# Patient Record
Sex: FEMALE | Race: OTHER | HISPANIC OR LATINO
[De-identification: names, ages, dates, MRNs, and addresses within clinical notes are randomized per-mention and may not be internally consistent; named-entity substitution may affect disease eponyms.]

---

## 2017-06-16 ENCOUNTER — OTHER (OUTPATIENT)
Age: 57
End: 2017-06-16

## 2017-06-20 ENCOUNTER — LABORATORY RESULT (OUTPATIENT)
Age: 57
End: 2017-06-20

## 2017-06-20 ENCOUNTER — APPOINTMENT (OUTPATIENT)
Dept: INTERNAL MEDICINE | Facility: CLINIC | Age: 57
End: 2017-06-20

## 2017-06-20 VITALS
DIASTOLIC BLOOD PRESSURE: 80 MMHG | HEART RATE: 70 BPM | HEIGHT: 60 IN | SYSTOLIC BLOOD PRESSURE: 120 MMHG | OXYGEN SATURATION: 98 % | WEIGHT: 134 LBS | BODY MASS INDEX: 26.31 KG/M2

## 2017-06-20 DIAGNOSIS — F98.0 ENURESIS NOT DUE TO A SUBSTANCE OR KNOWN PHYSIOLOGICAL CONDITION: ICD-10-CM

## 2017-06-26 LAB
25(OH)D3 SERPL-MCNC: 25 NG/ML
ANION GAP SERPL CALC-SCNC: 18 MMOL/L
APPEARANCE: CLEAR
BACTERIA UR CULT: NORMAL
BILIRUBIN URINE: NEGATIVE
BLOOD URINE: ABNORMAL
BUN SERPL-MCNC: 15 MG/DL
CALCIUM SERPL-MCNC: 9.8 MG/DL
CHLORIDE SERPL-SCNC: 101 MMOL/L
CHOLEST SERPL-MCNC: 202 MG/DL
CHOLEST/HDLC SERPL: 2.5 RATIO
CO2 SERPL-SCNC: 23 MMOL/L
COLOR: YELLOW
CREAT SERPL-MCNC: 0.78 MG/DL
GLUCOSE QUALITATIVE U: NORMAL MG/DL
GLUCOSE SERPL-MCNC: 117 MG/DL
HBA1C MFR BLD HPLC: 6.3 %
HDLC SERPL-MCNC: 82 MG/DL
KETONES URINE: NEGATIVE
LDLC SERPL CALC-MCNC: 101 MG/DL
LEUKOCYTE ESTERASE URINE: NEGATIVE
NITRITE URINE: NEGATIVE
PH URINE: 7
POTASSIUM SERPL-SCNC: 4.8 MMOL/L
PROTEIN URINE: NEGATIVE MG/DL
SODIUM SERPL-SCNC: 142 MMOL/L
SPECIFIC GRAVITY URINE: 1.02
TRIGL SERPL-MCNC: 94 MG/DL
TSH SERPL-ACNC: 4.56 UIU/ML
UROBILINOGEN URINE: NORMAL MG/DL

## 2017-09-06 ENCOUNTER — APPOINTMENT (OUTPATIENT)
Dept: UROGYNECOLOGY | Facility: CLINIC | Age: 57
End: 2017-09-06
Payer: COMMERCIAL

## 2017-09-06 VITALS
WEIGHT: 132 LBS | BODY MASS INDEX: 25.91 KG/M2 | DIASTOLIC BLOOD PRESSURE: 70 MMHG | SYSTOLIC BLOOD PRESSURE: 122 MMHG | HEIGHT: 60 IN

## 2017-09-06 PROCEDURE — 51701 INSERT BLADDER CATHETER: CPT

## 2017-09-06 PROCEDURE — 99244 OFF/OP CNSLTJ NEW/EST MOD 40: CPT | Mod: 25

## 2017-09-07 ENCOUNTER — RESULT REVIEW (OUTPATIENT)
Age: 57
End: 2017-09-07

## 2017-09-07 LAB
APPEARANCE: CLEAR
BACTERIA: NEGATIVE
BILIRUB UR QL STRIP: NORMAL
BILIRUBIN URINE: NEGATIVE
BLOOD URINE: NEGATIVE
CLARITY UR: CLEAR
COLLECTION METHOD: NORMAL
COLOR: YELLOW
GLUCOSE QUALITATIVE U: NORMAL MG/DL
GLUCOSE UR-MCNC: NORMAL
HCG UR QL: 0.2 EU/DL
HGB UR QL STRIP.AUTO: NORMAL
HYALINE CASTS: 0 /LPF
KETONES UR-MCNC: NORMAL
KETONES URINE: NEGATIVE
LEUKOCYTE ESTERASE UR QL STRIP: NORMAL
LEUKOCYTE ESTERASE URINE: NEGATIVE
MICROSCOPIC-UA: NORMAL
NITRITE UR QL STRIP: NORMAL
NITRITE URINE: NEGATIVE
PH UR STRIP: 5.5
PH URINE: 6.5
PROT UR STRIP-MCNC: NORMAL
PROTEIN URINE: NEGATIVE MG/DL
RED BLOOD CELLS URINE: 2 /HPF
SP GR UR STRIP: 1
SPECIFIC GRAVITY URINE: 1.01
SQUAMOUS EPITHELIAL CELLS: 0 /HPF
URINE COMMENTS: NORMAL
UROBILINOGEN URINE: NORMAL MG/DL
WHITE BLOOD CELLS URINE: 1 /HPF

## 2017-09-08 ENCOUNTER — RESULT REVIEW (OUTPATIENT)
Age: 57
End: 2017-09-08

## 2017-09-08 LAB — BACTERIA UR CULT: NORMAL

## 2017-09-22 ENCOUNTER — APPOINTMENT (OUTPATIENT)
Dept: UROGYNECOLOGY | Facility: CLINIC | Age: 57
End: 2017-09-22
Payer: COMMERCIAL

## 2017-09-22 ENCOUNTER — OUTPATIENT (OUTPATIENT)
Dept: OUTPATIENT SERVICES | Facility: HOSPITAL | Age: 57
LOS: 1 days | End: 2017-09-22
Payer: COMMERCIAL

## 2017-09-22 PROCEDURE — 51729 CYSTOMETROGRAM W/VP&UP: CPT | Mod: 26

## 2017-09-22 PROCEDURE — 51797 INTRAABDOMINAL PRESSURE TEST: CPT | Mod: 26

## 2017-09-22 PROCEDURE — 51784 ANAL/URINARY MUSCLE STUDY: CPT | Mod: 26

## 2017-09-22 PROCEDURE — 51729 CYSTOMETROGRAM W/VP&UP: CPT

## 2017-09-22 PROCEDURE — 51784 ANAL/URINARY MUSCLE STUDY: CPT

## 2017-09-22 PROCEDURE — 51797 INTRAABDOMINAL PRESSURE TEST: CPT

## 2017-09-22 RX ORDER — OXYCODONE AND ACETAMINOPHEN 5; 325 MG/1; MG/1
5-325 TABLET ORAL
Qty: 60 | Refills: 0 | Status: DISCONTINUED | COMMUNITY
Start: 2017-03-28

## 2017-09-27 ENCOUNTER — APPOINTMENT (OUTPATIENT)
Dept: UROGYNECOLOGY | Facility: CLINIC | Age: 57
End: 2017-09-27
Payer: COMMERCIAL

## 2017-09-27 DIAGNOSIS — Z87.448 PERSONAL HISTORY OF OTHER DISEASES OF URINARY SYSTEM: ICD-10-CM

## 2017-09-27 DIAGNOSIS — N39.3 STRESS INCONTINENCE (FEMALE) (MALE): ICD-10-CM

## 2017-09-27 DIAGNOSIS — R35.0 FREQUENCY OF MICTURITION: ICD-10-CM

## 2017-09-27 DIAGNOSIS — R39.15 URGENCY OF URINATION: ICD-10-CM

## 2017-09-27 PROCEDURE — 99214 OFFICE O/P EST MOD 30 MIN: CPT

## 2017-09-28 ENCOUNTER — RESULT REVIEW (OUTPATIENT)
Age: 57
End: 2017-09-28

## 2017-09-28 LAB
BILIRUB UR QL STRIP: NORMAL
CLARITY UR: CLEAR
COLLECTION METHOD: NORMAL
GLUCOSE UR-MCNC: NORMAL
HCG UR QL: 0.2 EU/DL
HGB UR QL STRIP.AUTO: NORMAL
KETONES UR-MCNC: NORMAL
LEUKOCYTE ESTERASE UR QL STRIP: NORMAL
NITRITE UR QL STRIP: NORMAL
PH UR STRIP: 5.5
PROT UR STRIP-MCNC: NORMAL
SP GR UR STRIP: 1.01

## 2017-10-02 ENCOUNTER — MEDICATION RENEWAL (OUTPATIENT)
Age: 57
End: 2017-10-02

## 2017-10-02 DIAGNOSIS — N39.3 STRESS INCONTINENCE (FEMALE) (MALE): ICD-10-CM

## 2017-10-02 LAB — BACTERIA UR CULT: ABNORMAL

## 2017-10-02 RX ORDER — SULFAMETHOXAZOLE AND TRIMETHOPRIM 800; 160 MG/1; MG/1
800-160 TABLET ORAL TWICE DAILY
Qty: 6 | Refills: 0 | Status: ACTIVE | COMMUNITY
Start: 2017-10-02 | End: 1900-01-01

## 2017-11-27 ENCOUNTER — RX RENEWAL (OUTPATIENT)
Age: 57
End: 2017-11-27

## 2017-12-27 ENCOUNTER — APPOINTMENT (OUTPATIENT)
Dept: UROGYNECOLOGY | Facility: CLINIC | Age: 57
End: 2017-12-27

## 2022-04-07 ENCOUNTER — RESULT REVIEW (OUTPATIENT)
Age: 62
End: 2022-04-07

## 2022-04-20 ENCOUNTER — APPOINTMENT (OUTPATIENT)
Dept: PAIN MANAGEMENT | Facility: CLINIC | Age: 62
End: 2022-04-20
Payer: OTHER MISCELLANEOUS

## 2022-04-20 VITALS — BODY MASS INDEX: 26.5 KG/M2 | WEIGHT: 135 LBS | HEIGHT: 60 IN

## 2022-04-20 DIAGNOSIS — M79.10 MYALGIA, UNSPECIFIED SITE: ICD-10-CM

## 2022-04-20 PROCEDURE — 99072 ADDL SUPL MATRL&STAF TM PHE: CPT

## 2022-04-20 PROCEDURE — 99214 OFFICE O/P EST MOD 30 MIN: CPT

## 2022-04-20 NOTE — HISTORY OF PRESENT ILLNESS
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

## 2022-04-20 NOTE — DISCUSSION/SUMMARY
[Surgical risks reviewed] : Surgical risks reviewed [de-identified] : MRI reviewed with patient.\par \par After discussing various treatment options with the patient including but not limited to oral medications, physical therapy, exercise,\par modalities as well as interventional spinal injections, we have decided with the following plan\par \par I personally reviewed the MRI/CT scan images and agree with the radiologist's report. The\par radiological findings were discussed with the patient.\par \par \par The risks, benefits, contents and alternatives to injection were explained in full to the patient. Risks outlined include but are not\par limited to infection,sepsis, bleeding, post-dural puncture headache, nerve damage, temporary increase in pain, syncopal episode,\par failure to resolve symptoms, allergic reaction, symptom recurrence, and elevation of blood sugar in diabetics. Cortisone may cause\par immunosuppression. Patient understands the risks. All questions were answered. After discussion of options, patient requested\par an injection. Information regarding the injection was given to the patient. Which medications to stop prior to the injection was\par explained to the patient as well.\par \par Follow up in 1-2 weeks post injection for re-evaluation.\par \par  Conservative Care\par Continue Home exercises, stretching, activity modification, physical therapy, and conservative care.\par

## 2022-04-20 NOTE — PHYSICAL EXAM
[Normal Coordination] : normal coordination [Normal DTR UE/LE] : normal DTR UE/LE  [Normal Sensation] : normal sensation [Normal Mood and Affect] : normal mood and affect [Orientated] : orientated [Able to Communicate] : able to communicate [Normal Skin] : normal skin [No Rash] : no rash [No Ulcers] : no ulcers [No Lesions] : no lesions [No obvious lymphadenopathy in areas examined] : no obvious lymphadenopathy in areas examined [Well Developed] : well developed [Well Nourished] : well nourished [No Respiratory Distress] : no respiratory distress [] : positive Spurling [Left Deltoid] : left deltoid [Left Radial Forearm] : left radial forearm [Left Ulnar Forearm] : left ulnar forearm

## 2022-04-20 NOTE — WORK
[Other: ___] : [unfilled] [Was the competent medical cause of the injury] : was the competent medical cause of the injury [Are consistent with the injury] : are consistent with the injury [Consistent with my objective findings] : consistent with my objective findings [Partial] : partial [Can return to work with limitations on ______] : can return to work with limitations on [unfilled] [Rx may affect patient's ability to return to work, make patient drowsy, or other issue] : Rx may affect patient's ability to return to work, make patient drowsy, or other issue. [I provided the services listed above] :  I provided the services listed above. [FreeTextEntry1] : guarded [FreeTextEntry3] : JUSTIN Jaime

## 2022-08-03 ENCOUNTER — APPOINTMENT (OUTPATIENT)
Dept: PAIN MANAGEMENT | Facility: CLINIC | Age: 62
End: 2022-08-03

## 2022-09-21 ENCOUNTER — APPOINTMENT (OUTPATIENT)
Dept: PAIN MANAGEMENT | Facility: CLINIC | Age: 62
End: 2022-09-21

## 2022-09-21 VITALS — WEIGHT: 137 LBS | BODY MASS INDEX: 26.9 KG/M2 | HEIGHT: 60 IN

## 2022-09-21 PROCEDURE — 99072 ADDL SUPL MATRL&STAF TM PHE: CPT

## 2022-09-21 PROCEDURE — 99214 OFFICE O/P EST MOD 30 MIN: CPT

## 2022-09-21 NOTE — HISTORY OF PRESENT ILLNESS
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

## 2022-09-21 NOTE — DISCUSSION/SUMMARY
[Surgical risks reviewed] : Surgical risks reviewed [de-identified] : MRI reviewed with patient.\par \par After discussing various treatment options with the patient including but not limited to oral medications, physical therapy, exercise,\par modalities as well as interventional spinal injections, we have decided with the following plan\par \par I personally reviewed the MRI/CT scan images and agree with the radiologist's report. The\par radiological findings were discussed with the patient.\par \par \par The risks, benefits, contents and alternatives to injection were explained in full to the patient. Risks outlined include but are not\par limited to infection,sepsis, bleeding, post-dural puncture headache, nerve damage, temporary increase in pain, syncopal episode,\par failure to resolve symptoms, allergic reaction, symptom recurrence, and elevation of blood sugar in diabetics. Cortisone may cause\par immunosuppression. Patient understands the risks. All questions were answered. After discussion of options, patient requested\par an injection. Information regarding the injection was given to the patient. Which medications to stop prior to the injection was\par explained to the patient as well.\par \par Follow up in 1-2 weeks post injection for re-evaluation.\par \par Proceed with C7-T1 YULISA. \par \par Pt never received a call to schedule injection from last visit. Pain is bilaterally radiating. \par \par Pt has not been doing PT or chiro. \par \par \par  Conservative Care\par Continue Home exercises, stretching, activity modification, physical therapy, and conservative care.\par

## 2022-11-09 ENCOUNTER — APPOINTMENT (OUTPATIENT)
Dept: ORTHOPEDIC SURGERY | Facility: CLINIC | Age: 62
End: 2022-11-09

## 2022-11-09 VITALS — BODY MASS INDEX: 26.9 KG/M2 | WEIGHT: 137 LBS | HEIGHT: 60 IN

## 2022-11-09 PROCEDURE — 73030 X-RAY EXAM OF SHOULDER: CPT | Mod: RT

## 2022-11-09 PROCEDURE — 99072 ADDL SUPL MATRL&STAF TM PHE: CPT

## 2022-11-09 PROCEDURE — 73010 X-RAY EXAM OF SHOULDER BLADE: CPT | Mod: RT

## 2022-11-09 PROCEDURE — 99214 OFFICE O/P EST MOD 30 MIN: CPT

## 2022-11-09 NOTE — IMAGING
[Right] : right shoulder [Degenerative change] : Degenerative change [de-identified] : Right shoulder:\par Skin intact\par FF: 170\par Abd: 160\par ER: 60\par IR: 50\par %/% strength in all planes\par NVI distally

## 2022-11-09 NOTE — WORK
[Sprain/Strain] : sprain/strain [Was not the competent medical cause of the injury] : was not the competent medical cause of the injury [Are consistent with the injury] : are consistent with the injury [Mild Partial] : mild partial [Reveals pre-existing condition(s) that may affect treatment/prognosis] : reveals pre-existing condition(s) that may affect treatment/prognosis [Unknown at this time] : : unknown at this time [Patient] : patient [No Rx restrictions] : No Rx restrictions. [I provided the services listed above] :  I provided the services listed above. [Can return to work without limitations on ______] : can return to work without limitations on [unfilled] [FreeTextEntry2] : prior rtc repair

## 2022-11-09 NOTE — HISTORY OF PRESENT ILLNESS
[6] : 6 [5] : 5 [Dull/Aching] : dull/aching [Meds] : meds [Ice] : ice [] : no [FreeTextEntry1] : R SHOULDER [FreeTextEntry3] : FEW WEEKS AGO [FreeTextEntry5] : NO INJURY [de-identified] : 2020-DR. PARRA

## 2022-11-09 NOTE — ASSESSMENT
[FreeTextEntry1] : Will restart PT and wean to a HEP.\par Diclofenac as needed.\par Follow up as needed.\par Approaching MMI

## 2022-11-11 ENCOUNTER — NON-APPOINTMENT (OUTPATIENT)
Age: 62
End: 2022-11-11

## 2022-11-14 ENCOUNTER — APPOINTMENT (OUTPATIENT)
Age: 62
End: 2022-11-14

## 2022-11-14 PROCEDURE — 62321 NJX INTERLAMINAR CRV/THRC: CPT

## 2022-11-30 ENCOUNTER — APPOINTMENT (OUTPATIENT)
Dept: PAIN MANAGEMENT | Facility: CLINIC | Age: 62
End: 2022-11-30

## 2023-04-12 ENCOUNTER — APPOINTMENT (OUTPATIENT)
Dept: PAIN MANAGEMENT | Facility: CLINIC | Age: 63
End: 2023-04-12

## 2023-04-13 ENCOUNTER — APPOINTMENT (OUTPATIENT)
Dept: PAIN MANAGEMENT | Facility: CLINIC | Age: 63
End: 2023-04-13

## 2023-04-14 ENCOUNTER — APPOINTMENT (OUTPATIENT)
Dept: PAIN MANAGEMENT | Facility: CLINIC | Age: 63
End: 2023-04-14

## 2023-04-17 ENCOUNTER — APPOINTMENT (OUTPATIENT)
Dept: ORTHOPEDIC SURGERY | Facility: CLINIC | Age: 63
End: 2023-04-17
Payer: OTHER MISCELLANEOUS

## 2023-04-17 VITALS — BODY MASS INDEX: 26.5 KG/M2 | WEIGHT: 135 LBS | HEIGHT: 60 IN

## 2023-04-17 PROCEDURE — 99213 OFFICE O/P EST LOW 20 MIN: CPT

## 2023-04-17 RX ORDER — DICLOFENAC SODIUM 75 MG/1
75 TABLET, DELAYED RELEASE ORAL
Qty: 60 | Refills: 0 | Status: ACTIVE | COMMUNITY
Start: 2022-11-09 | End: 1900-01-01

## 2023-04-17 NOTE — IMAGING
[de-identified] : RIGHT SHOULDER\par No swelling, no ecchymosis, no deformity, no scapular winging.\par No tenderness to palpation over shoulder, AC joint or trapezius.\par Forward flexion to 180; external rotation to 90 degrees (with shoulder abducted); internal rotation to 70 degrees (with shoulder abducted) WITH PAIN\par 5/5 supraspinatus, infraspinatus and subscapularis WITH PAIN\par Negative Newton test, POSITIVE impingement sign, negative O’Alfa test.\par Speed’s and yergason negative\par Motor and sensory intact distally\par

## 2023-04-17 NOTE — HISTORY OF PRESENT ILLNESS
[6] : 6 [5] : 5 [Dull/Aching] : dull/aching [Meds] : meds [Ice] : ice [Right Arm] : right arm [Work related] : work related [Gradual] : gradual [Full time] : Work status: full time [de-identified] : 04/17/2023 here today with right shoulder pain follow up  ,still in pain  [] : no [FreeTextEntry1] : R SHOULDER [FreeTextEntry3] : 03/05/2021 [FreeTextEntry5] : work injury  [de-identified] : with activity  [de-identified] : 2020-DR. PARRA [de-identified] : nothing

## 2023-04-17 NOTE — WORK
[Sprain/Strain] : sprain/strain [Was not the competent medical cause of the injury] : was not the competent medical cause of the injury [Are consistent with the injury] : are consistent with the injury [Mild Partial] : mild partial [Reveals pre-existing condition(s) that may affect treatment/prognosis] : reveals pre-existing condition(s) that may affect treatment/prognosis [Unknown at this time] : : unknown at this time [Patient] : patient [No Rx restrictions] : No Rx restrictions. [I provided the services listed above] :  I provided the services listed above. [Can return to work with limitations on ______] : can return to work with limitations on [unfilled] [Lifting] : lifting [FreeTextEntry2] : prior rtc repair

## 2023-04-17 NOTE — ASSESSMENT
[FreeTextEntry1] : PAIN PERSISTS IN RIGHT SHOULDER\par CSI DISCUSSED, PT DECLINED\par DICLOFENAC RX\par RESTART PT\par UPDATED MRI TO EVAL WORSENING OF PRIOR TEAR SEEN ON MRI IN 2020, H/O RCR 2017\par WORKING LIGHT DUTY\par RTO P MRI

## 2023-04-19 ENCOUNTER — APPOINTMENT (OUTPATIENT)
Dept: PAIN MANAGEMENT | Facility: CLINIC | Age: 63
End: 2023-04-19

## 2023-04-19 ENCOUNTER — APPOINTMENT (OUTPATIENT)
Dept: ORTHOPEDIC SURGERY | Facility: CLINIC | Age: 63
End: 2023-04-19

## 2023-04-19 ENCOUNTER — APPOINTMENT (OUTPATIENT)
Dept: PAIN MANAGEMENT | Facility: CLINIC | Age: 63
End: 2023-04-19
Payer: OTHER MISCELLANEOUS

## 2023-04-19 VITALS — HEIGHT: 60 IN | WEIGHT: 136 LBS | BODY MASS INDEX: 26.7 KG/M2

## 2023-04-19 PROCEDURE — 99214 OFFICE O/P EST MOD 30 MIN: CPT

## 2023-04-19 NOTE — ASSESSMENT
[FreeTextEntry1] : 11/22 YULISA C7-T1\par \par Pt failed to followup but notes approx. 50% relief lasting for approx. 1 month or more and then pain returned. Given her response to injections, prior to proceeding with repeat wull obtain surgical input

## 2023-04-19 NOTE — HISTORY OF PRESENT ILLNESS
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

## 2023-04-19 NOTE — PHYSICAL EXAM
[Normal Coordination] : normal coordination [Normal DTR UE/LE] : normal DTR UE/LE  [Normal Sensation] : normal sensation [Orientated] : orientated [Normal Mood and Affect] : normal mood and affect [Able to Communicate] : able to communicate [Normal Skin] : normal skin [No Rash] : no rash [No Ulcers] : no ulcers [No Lesions] : no lesions [No obvious lymphadenopathy in areas examined] : no obvious lymphadenopathy in areas examined [Well Developed] : well developed [Well Nourished] : well nourished [No Respiratory Distress] : no respiratory distress [] : positive Spurling [Left Deltoid] : left deltoid [Left Radial Forearm] : left radial forearm [Left Ulnar Forearm] : left ulnar forearm

## 2023-04-19 NOTE — DISCUSSION/SUMMARY
[Surgical risks reviewed] : Surgical risks reviewed [de-identified] : After discussing various treatment options with the patient including but not limited to oral medications, physical therapy, exercise,\par modalities as well as interventional spinal injections, we have decided with the following plan\par \par I personally reviewed the MRI/CT scan images and agree with the radiologist's report. The\par radiological findings were discussed with the patient.\par \par \par The risks, benefits, contents and alternatives to injection were explained in full to the patient. Risks outlined include but are not\par limited to infection,sepsis, bleeding, post-dural puncture headache, nerve damage, temporary increase in pain, syncopal episode,\par failure to resolve symptoms, allergic reaction, symptom recurrence, and elevation of blood sugar in diabetics. Cortisone may cause\par immunosuppression. Patient understands the risks. All questions were answered. After discussion of options, patient requested\par an injection. Information regarding the injection was given to the patient. Which medications to stop prior to the injection was\par explained to the patient as well.\par \par Follow up in 1-2 weeks post injection for re-evaluation.\par \par  Conservative Care\par Continue Home exercises, stretching, activity modification, physical therapy, and conservative care.\par \par Proceed with C7-T1 YULISA.

## 2023-05-01 ENCOUNTER — FORM ENCOUNTER (OUTPATIENT)
Age: 63
End: 2023-05-01

## 2023-05-10 ENCOUNTER — FORM ENCOUNTER (OUTPATIENT)
Age: 63
End: 2023-05-10

## 2023-05-10 ENCOUNTER — APPOINTMENT (OUTPATIENT)
Dept: PAIN MANAGEMENT | Facility: CLINIC | Age: 63
End: 2023-05-10
Payer: OTHER MISCELLANEOUS

## 2023-05-10 VITALS — HEIGHT: 60 IN | BODY MASS INDEX: 27.68 KG/M2 | WEIGHT: 141 LBS

## 2023-05-10 PROCEDURE — 99214 OFFICE O/P EST MOD 30 MIN: CPT

## 2023-05-10 NOTE — DISCUSSION/SUMMARY
[de-identified] : It does not appear that patient has yet obtained surgical input. She states she has an appt in June, will defer injections until that eval is completed given short duration of relief from prior injection. \par \par She is still awaiting approval for PT\par \par Patient has had a prior course of PT with good results meaning a better overal function with less pain, improved mobility and ability to accomplish ADL's as she awaits possible surgical plan PT can be a tool gisela improve or resolve her pain complaints. If surgery is pursued her outcome will be improved with a background of PT and physical activity.

## 2023-05-10 NOTE — HISTORY OF PRESENT ILLNESS
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

## 2023-05-17 ENCOUNTER — APPOINTMENT (OUTPATIENT)
Dept: ORTHOPEDIC SURGERY | Facility: CLINIC | Age: 63
End: 2023-05-17
Payer: OTHER MISCELLANEOUS

## 2023-05-17 VITALS — HEIGHT: 60 IN | BODY MASS INDEX: 27.68 KG/M2 | WEIGHT: 141 LBS

## 2023-05-17 PROCEDURE — J3490M: CUSTOM

## 2023-05-17 PROCEDURE — 20611 DRAIN/INJ JOINT/BURSA W/US: CPT | Mod: RT

## 2023-05-17 PROCEDURE — 99213 OFFICE O/P EST LOW 20 MIN: CPT | Mod: 25

## 2023-05-17 NOTE — ASSESSMENT
[FreeTextEntry1] : PAIN PERSISTS IN RIGHT SHOULDER\par CSI DISCUSSED AND DONE TODAY, TOLERATED WELL\par DICLOFENAC RX\par CONT PT\par WAITING ON APPROVAL FOR UPDATED MRI TO EVAL WORSENING OF PRIOR TEAR SEEN ON MRI IN 2020, H/O RCR 2017\par WORKING LIGHT DUTY\par RTO P MRI

## 2023-05-17 NOTE — WORK
[Mild Partial] : mild partial [Reveals pre-existing condition(s) that may affect treatment/prognosis] : reveals pre-existing condition(s) that may affect treatment/prognosis [Can return to work with limitations on ______] : can return to work with limitations on [unfilled] [Lifting] : lifting [Unknown at this time] : : unknown at this time [Patient] : patient [No Rx restrictions] : No Rx restrictions. [I provided the services listed above] :  I provided the services listed above. [FreeTextEntry2] : prior rtc repair

## 2023-05-17 NOTE — IMAGING
[de-identified] : RIGHT SHOULDER\par No swelling, no ecchymosis, no deformity, no scapular winging.\par No tenderness to palpation over shoulder, AC joint or trapezius.\par Forward flexion to 180; external rotation to 90 degrees (with shoulder abducted); internal rotation to 70 degrees (with shoulder abducted) WITH PAIN\par 5/5 supraspinatus, infraspinatus and subscapularis WITH PAIN\par Negative Newton test, POSITIVE impingement sign, negative O’Alfa test.\par Speed’s and yergason negative\par Motor and sensory intact distally\par

## 2023-05-17 NOTE — HISTORY OF PRESENT ILLNESS
[Work related] : work related [8] : 8 [7] : 7 [Dull/Aching] : dull/aching [Full time] : Work status: full time [FreeTextEntry1] : R shoulder [de-identified] : denied PT

## 2023-06-16 ENCOUNTER — APPOINTMENT (OUTPATIENT)
Dept: ORTHOPEDIC SURGERY | Facility: CLINIC | Age: 63
End: 2023-06-16

## 2023-09-28 ENCOUNTER — APPOINTMENT (OUTPATIENT)
Dept: ORTHOPEDIC SURGERY | Facility: CLINIC | Age: 63
End: 2023-09-28
Payer: OTHER MISCELLANEOUS

## 2023-09-28 VITALS — WEIGHT: 141 LBS | HEIGHT: 60 IN | BODY MASS INDEX: 27.68 KG/M2

## 2023-09-28 DIAGNOSIS — M25.511 PAIN IN RIGHT SHOULDER: ICD-10-CM

## 2023-09-28 PROCEDURE — 99214 OFFICE O/P EST MOD 30 MIN: CPT

## 2023-09-28 RX ORDER — NAPROXEN 500 MG/1
500 TABLET ORAL TWICE DAILY
Qty: 60 | Refills: 0 | Status: ACTIVE | COMMUNITY
Start: 2023-09-28 | End: 1900-01-01

## 2023-10-06 ENCOUNTER — APPOINTMENT (OUTPATIENT)
Dept: ORTHOPEDIC SURGERY | Facility: CLINIC | Age: 63
End: 2023-10-06
Payer: OTHER MISCELLANEOUS

## 2023-10-06 PROCEDURE — 99215 OFFICE O/P EST HI 40 MIN: CPT

## 2023-10-06 PROCEDURE — 72040 X-RAY EXAM NECK SPINE 2-3 VW: CPT

## 2023-10-11 ENCOUNTER — APPOINTMENT (OUTPATIENT)
Dept: MRI IMAGING | Facility: CLINIC | Age: 63
End: 2023-10-11
Payer: OTHER MISCELLANEOUS

## 2023-10-11 PROCEDURE — 73221 MRI JOINT UPR EXTREM W/O DYE: CPT | Mod: RT

## 2023-10-18 ENCOUNTER — APPOINTMENT (OUTPATIENT)
Dept: ORTHOPEDIC SURGERY | Facility: CLINIC | Age: 63
End: 2023-10-18
Payer: OTHER MISCELLANEOUS

## 2023-10-18 VITALS — WEIGHT: 141 LBS | BODY MASS INDEX: 27.68 KG/M2 | HEIGHT: 60 IN

## 2023-10-18 DIAGNOSIS — Z98.890 OTHER SPECIFIED POSTPROCEDURAL STATES: ICD-10-CM

## 2023-10-18 PROCEDURE — 99214 OFFICE O/P EST MOD 30 MIN: CPT

## 2023-11-13 ENCOUNTER — APPOINTMENT (OUTPATIENT)
Dept: ORTHOPEDIC SURGERY | Facility: CLINIC | Age: 63
End: 2023-11-13
Payer: OTHER MISCELLANEOUS

## 2023-11-13 VITALS — BODY MASS INDEX: 27.68 KG/M2 | WEIGHT: 141 LBS | HEIGHT: 60 IN

## 2023-11-13 PROCEDURE — 99214 OFFICE O/P EST MOD 30 MIN: CPT

## 2023-12-11 ENCOUNTER — APPOINTMENT (OUTPATIENT)
Dept: ORTHOPEDIC SURGERY | Facility: CLINIC | Age: 63
End: 2023-12-11
Payer: OTHER MISCELLANEOUS

## 2023-12-11 DIAGNOSIS — M54.2 CERVICALGIA: ICD-10-CM

## 2023-12-11 PROCEDURE — 99245 OFF/OP CONSLTJ NEW/EST HI 55: CPT

## 2023-12-15 NOTE — HISTORY OF PRESENT ILLNESS
[de-identified] : WC DOI 5/31/2016   10/16/23: 64 yo f here for an evaluation of her neck; she reports at work she sipped on plastic bags that were on the floor and sustained an injury to both shoulder and her neck. Pain shoots into the left arm and can radiate into the small finger. Some pain also that shoots into the right arm. No loss of fine motor motions.  She had mutiple injection in the neck with Dr Wilkins - surgical input was recommended prior to continuing her injection therapy.  She has attended PT in the past - last 8 months ago. This was helpful She takes tylenol as needed. 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  11/13/23: here for fu - plan at last was DISCUSSION OF surgery and referral to pain managment - has followed up with me instead - the pain remains in the neck and the arms - not interested in surgery  --------------------------  12/11/23: Here for follow-up;  overall doing about the same; pain remains in the neck and the arms. Down the right arm more than the left - worse with turning the neck to the rigth - no loss of fine motor - has tingling in the hands at times  PT has been denied.  She has a pain management appointment with Dr Hackett next month.  No chiro/accupuncture  working full time currently - meat department at [employer] - working in a light duty capacity   able to drive a car - can drive about 20 miles before it gets bad Able to bathe and dress herself  able to toilet herself able to prepare light food only  able to do some chores but only the lighter one - has to use cleaning service  lives at home with her daughter   no emg/ncs  MRI C-spine 5/7/22 Interval progression of the broad-based central disc herniation posterior osseous ridging at C5-6 which now moderately narrows the spinal canal and contributes to mild left-sided neural foraminal narrowing. [FreeTextEntry3] : 05/31/16 [FreeTextEntry5] : GENE is here today to follow up on her c-spine. pt states there are no changes in their symptoms since the last visit. PT was denied.

## 2023-12-15 NOTE — DISCUSSION/SUMMARY
[de-identified] : reviewed the case and the imaging with her today  Will fill out C4.3 based on visit today light duty work be my recommendation  would be a good candidate for surgery  going to  see pain managment at this point  fu with me if interested in surgery

## 2023-12-15 NOTE — WORK
[___lbs] : Occasionally: [unfilled] lbs [] : Frequently [Light Work] : Light work [Has the patient had an injury/illness since the date of injury which impacts residual functional capacity?] : No [de-identified] : cervical spine [de-identified] : table 11.1 S11.7a [de-identified] : E (20 points) (range 17-32)  [de-identified] : cervical spine - class 4 due to work related injury, persistent symptoms, positive tension/comrpession signs - see point allocation: imaging - nerve comrpession = 16 points emg/ncs - not done = 0 points str - no loss of str greater than grade 3 nor is there atrophy = 0 points sensation - intact = 0 points  reflexes - symmtric = 0 points tension/compression sign - pos spurling = 4 points  total = 20 points

## 2024-01-08 ENCOUNTER — APPOINTMENT (OUTPATIENT)
Dept: PAIN MANAGEMENT | Facility: CLINIC | Age: 64
End: 2024-01-08

## 2024-01-11 ENCOUNTER — APPOINTMENT (OUTPATIENT)
Dept: PAIN MANAGEMENT | Facility: CLINIC | Age: 64
End: 2024-01-11
Payer: OTHER MISCELLANEOUS

## 2024-01-11 VITALS — BODY MASS INDEX: 25.32 KG/M2 | WEIGHT: 129 LBS | HEIGHT: 60 IN

## 2024-01-11 DIAGNOSIS — M54.12 RADICULOPATHY, CERVICAL REGION: ICD-10-CM

## 2024-01-11 PROCEDURE — 99204 OFFICE O/P NEW MOD 45 MIN: CPT

## 2024-01-11 RX ORDER — NORTRIPTYLINE HYDROCHLORIDE 25 MG/1
25 CAPSULE ORAL
Qty: 60 | Refills: 2 | Status: ACTIVE | COMMUNITY
Start: 2024-01-11 | End: 1900-01-01

## 2024-02-28 NOTE — ADDENDUM
[FreeTextEntry1] : Patient with at least 50% overall improvement following epidural. Improvements in ROM, strength and pain. This leads to an overall improvement in patients quality of life. I would recommend repeat EMELIA for cumulative improvement. Has had surgical eval and another YULISA was recommended

## 2024-02-28 NOTE — PHYSICAL EXAM
[Rotation to left] : rotation to left [] : no palpable masses [de-identified] : left lateral rotation 60 degrees

## 2024-02-28 NOTE — ASSESSMENT
[FreeTextEntry1] : After discussing various treatment options with the patient including but not limited to oral medications, physical therapy, exercise, modalities as well as interventional spinal injections, we have decided with the following plan:  1) Intervention Injection Therapy: I personally reviewed the MRI/CT scan images and agree with the radiologist's report. The radiological findings were discussed with the patient. The risks, benefits, contents and alternatives to injection were explained in full to the patient. Risks outlined include but are not limited to infection,sepsis, bleeding, post-dural puncture headache, nerve damage, temporary increase in pain, syncopal episode, failure to resolve symptoms, allergic reaction, symptom recurrence, and elevation of blood sugar in diabetics. Cortisone may cause immunosuppression. Patient understands the risks. All questions were answered. After discussion of options, patient requested an injection. Information regarding the injection was given to the patient. Which medications to stop prior to the injection was explained to the patient as well.  Follow up in 1-2 weeks post injection for re-evaluation.  Continue Home exercises, stretching, activity modification, physical therapy, and conservative care.  Patient is presenting with acute/sub-acute radicular pain with impairment in ADLs and functionality.  The pain has not responded sufficiently to  conservative care including nsaid therapy and/or physical therapy.  There is no bleeding tendency, unstable medical condition, or systemic infection. The purpose of the spinal injections is to facilitate active therapy by providing short term relief through reduction of pain and inflammation.   Injections, by themselves, are not likely to provide long-term relief. Rather, active rehabilitation with modified work achieves long-term relief by increasing active ROM, strength and stability.   C7-T1 Cervical Epidural Steroid Injection under fluoroscopic guidance with image. Of note, the C7-T1 level has been determined to be the safest level to inject in the cervical spine as the epidural space is the largest. Despite pathology at different levels, studies have shown that the medication will spread up to the most cranial level, despite the level of injection.   2) I would recommend a trial of neuropathic medication as patient presents with signs of nerve irritation. (ie burning, paresthesias etc) Goals of therapy would be to improve pain and overall QOL. Side effects reviewed with patient. Patient will call or stop medication if given side effects occur.  Pamelor

## 2024-02-28 NOTE — PHYSICAL EXAM
[Rotation to left] : rotation to left [] : no palpable masses [de-identified] : left lateral rotation 60 degrees

## 2024-02-28 NOTE — HISTORY OF PRESENT ILLNESS
[Neck] : neck [8] : 8 [Dull/Aching] : dull/aching [Stabbing] : stabbing [Constant] : constant [Household chores] : household chores [Leisure] : leisure [Work] : work [Sleep] : sleep [Meds] : meds [Injection therapy] : injection therapy [Nothing helps with pain getting better] : Nothing helps with pain getting better [Walking] : walking [Full time] : Work status: full time [FreeTextEntry1] : 01/11/2024 : Patient presents for initial evaluation. She is having pain on her neck it goes down her shoulder and arms.     Pain started when she was working at [employer] when she was pulling frozen ribs and the table flipped on 3/31/16. She had seen Dr. Jaime in the past for this and had injections which helped.  Last PT was 6 months ago and she had 24 sessions which had helped with her overall pain and function.    Subjective Weakness: Yes Numbness/Tingling: Yes Bladder/Bowel dysfunction: No Treatments Tried: Epidurals, physical therapy, medication.  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

## 2024-02-28 NOTE — HISTORY OF PRESENT ILLNESS
[Neck] : neck [8] : 8 [Dull/Aching] : dull/aching [Stabbing] : stabbing [Constant] : constant [Household chores] : household chores [Leisure] : leisure [Work] : work [Sleep] : sleep [Meds] : meds [Injection therapy] : injection therapy [Nothing helps with pain getting better] : Nothing helps with pain getting better [Walking] : walking [Full time] : Work status: full time [FreeTextEntry1] : 01/11/2024 : Patient presents for initial evaluation. She is having pain on her neck it goes down her shoulder and arms.     Pain started when she was working at [employer] when she was pulling frozen ribs and the table flipped on 3/31/16. She had seen Dr. Jaime in the past for this and had injections which helped.  Last PT was 6 months ago and she had 24 sessions which had helped with her overall pain and function.    Subjective Weakness: Yes Numbness/Tingling: Yes Bladder/Bowel dysfunction: No Treatments Tried: Epidurals, physical therapy, medication.  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

## 2024-10-16 ENCOUNTER — APPOINTMENT (OUTPATIENT)
Dept: ORTHOPEDIC SURGERY | Facility: CLINIC | Age: 64
End: 2024-10-16
Payer: OTHER MISCELLANEOUS

## 2024-10-16 VITALS — HEIGHT: 60 IN | WEIGHT: 129 LBS | BODY MASS INDEX: 25.32 KG/M2

## 2024-10-16 DIAGNOSIS — S20.211A CONTUSION OF RIGHT FRONT WALL OF THORAX, INITIAL ENCOUNTER: ICD-10-CM

## 2024-10-16 DIAGNOSIS — S40.011A CONTUSION OF RIGHT SHOULDER, INITIAL ENCOUNTER: ICD-10-CM

## 2024-10-16 PROCEDURE — 73030 X-RAY EXAM OF SHOULDER: CPT | Mod: RT

## 2024-10-16 PROCEDURE — 73010 X-RAY EXAM OF SHOULDER BLADE: CPT | Mod: RT

## 2024-10-16 PROCEDURE — 99214 OFFICE O/P EST MOD 30 MIN: CPT

## 2024-10-16 RX ORDER — MELOXICAM 15 MG/1
15 TABLET ORAL DAILY
Qty: 30 | Refills: 1 | Status: ACTIVE | COMMUNITY
Start: 2024-10-16 | End: 2024-12-15

## 2024-10-23 ENCOUNTER — APPOINTMENT (OUTPATIENT)
Dept: MRI IMAGING | Facility: CLINIC | Age: 64
End: 2024-10-23
Payer: OTHER MISCELLANEOUS

## 2024-10-23 PROCEDURE — 73221 MRI JOINT UPR EXTREM W/O DYE: CPT | Mod: RT

## 2024-11-06 ENCOUNTER — APPOINTMENT (OUTPATIENT)
Dept: ORTHOPEDIC SURGERY | Facility: CLINIC | Age: 64
End: 2024-11-06
Payer: OTHER MISCELLANEOUS

## 2024-11-06 VITALS — HEIGHT: 60 IN | BODY MASS INDEX: 25.32 KG/M2 | WEIGHT: 129 LBS

## 2024-11-06 DIAGNOSIS — S43.421A SPRAIN OF RIGHT ROTATOR CUFF CAPSULE, INITIAL ENCOUNTER: ICD-10-CM

## 2024-11-06 PROCEDURE — 99213 OFFICE O/P EST LOW 20 MIN: CPT
